# Patient Record
Sex: MALE | Race: WHITE | NOT HISPANIC OR LATINO | Employment: FULL TIME | URBAN - METROPOLITAN AREA
[De-identification: names, ages, dates, MRNs, and addresses within clinical notes are randomized per-mention and may not be internally consistent; named-entity substitution may affect disease eponyms.]

---

## 2022-12-09 ENCOUNTER — LAB (OUTPATIENT)
Dept: LAB | Facility: HOSPITAL | Age: 62
End: 2022-12-09

## 2022-12-09 ENCOUNTER — OFFICE VISIT (OUTPATIENT)
Dept: FAMILY MEDICINE CLINIC | Facility: CLINIC | Age: 62
End: 2022-12-09

## 2022-12-09 ENCOUNTER — PATIENT ROUNDING (BHMG ONLY) (OUTPATIENT)
Dept: FAMILY MEDICINE CLINIC | Facility: CLINIC | Age: 62
End: 2022-12-09

## 2022-12-09 VITALS
HEIGHT: 68 IN | WEIGHT: 206.6 LBS | BODY MASS INDEX: 31.31 KG/M2 | DIASTOLIC BLOOD PRESSURE: 102 MMHG | OXYGEN SATURATION: 98 % | SYSTOLIC BLOOD PRESSURE: 140 MMHG | HEART RATE: 72 BPM

## 2022-12-09 DIAGNOSIS — I10 PRIMARY HYPERTENSION: ICD-10-CM

## 2022-12-09 DIAGNOSIS — F41.9 ANXIETY: ICD-10-CM

## 2022-12-09 DIAGNOSIS — Z00.00 ENCOUNTER FOR MEDICAL EXAMINATION TO ESTABLISH CARE: ICD-10-CM

## 2022-12-09 DIAGNOSIS — Z13.1 SCREENING FOR DIABETES MELLITUS: ICD-10-CM

## 2022-12-09 DIAGNOSIS — I10 PRIMARY HYPERTENSION: Primary | ICD-10-CM

## 2022-12-09 DIAGNOSIS — E78.2 MIXED HYPERLIPIDEMIA: ICD-10-CM

## 2022-12-09 DIAGNOSIS — N40.0 BENIGN PROSTATIC HYPERPLASIA WITHOUT LOWER URINARY TRACT SYMPTOMS: ICD-10-CM

## 2022-12-09 DIAGNOSIS — Z51.81 MEDICATION MONITORING ENCOUNTER: ICD-10-CM

## 2022-12-09 DIAGNOSIS — Z23 IMMUNIZATION DUE: ICD-10-CM

## 2022-12-09 DIAGNOSIS — F90.9 ATTENTION DEFICIT HYPERACTIVITY DISORDER (ADHD), UNSPECIFIED ADHD TYPE: ICD-10-CM

## 2022-12-09 LAB
ALBUMIN SERPL-MCNC: 4.4 G/DL (ref 3.5–5.2)
ALBUMIN/GLOB SERPL: 1.7 G/DL
ALP SERPL-CCNC: 117 U/L (ref 39–117)
ALT SERPL W P-5'-P-CCNC: 16 U/L (ref 1–41)
ANION GAP SERPL CALCULATED.3IONS-SCNC: 9.2 MMOL/L (ref 5–15)
AST SERPL-CCNC: 12 U/L (ref 1–40)
BILIRUB SERPL-MCNC: 0.3 MG/DL (ref 0–1.2)
BUN SERPL-MCNC: 19 MG/DL (ref 8–23)
BUN/CREAT SERPL: 16.1 (ref 7–25)
CALCIUM SPEC-SCNC: 9.1 MG/DL (ref 8.6–10.5)
CHLORIDE SERPL-SCNC: 105 MMOL/L (ref 98–107)
CHOLEST SERPL-MCNC: 242 MG/DL (ref 0–200)
CO2 SERPL-SCNC: 24.8 MMOL/L (ref 22–29)
CREAT SERPL-MCNC: 1.18 MG/DL (ref 0.76–1.27)
DEPRECATED RDW RBC AUTO: 44.2 FL (ref 37–54)
EGFRCR SERPLBLD CKD-EPI 2021: 69.3 ML/MIN/1.73
ERYTHROCYTE [DISTWIDTH] IN BLOOD BY AUTOMATED COUNT: 13.1 % (ref 12.3–15.4)
GLOBULIN UR ELPH-MCNC: 2.6 GM/DL
GLUCOSE SERPL-MCNC: 102 MG/DL (ref 65–99)
HBA1C MFR BLD: 6.3 % (ref 4.8–5.6)
HCT VFR BLD AUTO: 47.1 % (ref 37.5–51)
HDLC SERPL-MCNC: 54 MG/DL (ref 40–60)
HGB BLD-MCNC: 15.3 G/DL (ref 13–17.7)
LDLC SERPL CALC-MCNC: 172 MG/DL (ref 0–100)
LDLC/HDLC SERPL: 3.14 {RATIO}
MCH RBC QN AUTO: 29.6 PG (ref 26.6–33)
MCHC RBC AUTO-ENTMCNC: 32.5 G/DL (ref 31.5–35.7)
MCV RBC AUTO: 91.1 FL (ref 79–97)
PLATELET # BLD AUTO: 391 10*3/MM3 (ref 140–450)
PMV BLD AUTO: 9.4 FL (ref 6–12)
POTASSIUM SERPL-SCNC: 4.9 MMOL/L (ref 3.5–5.2)
PROT SERPL-MCNC: 7 G/DL (ref 6–8.5)
RBC # BLD AUTO: 5.17 10*6/MM3 (ref 4.14–5.8)
SODIUM SERPL-SCNC: 139 MMOL/L (ref 136–145)
TRIGL SERPL-MCNC: 93 MG/DL (ref 0–150)
TSH SERPL DL<=0.05 MIU/L-ACNC: 1.32 UIU/ML (ref 0.27–4.2)
VLDLC SERPL-MCNC: 16 MG/DL (ref 5–40)
WBC NRBC COR # BLD: 6.82 10*3/MM3 (ref 3.4–10.8)

## 2022-12-09 PROCEDURE — 99204 OFFICE O/P NEW MOD 45 MIN: CPT | Performed by: STUDENT IN AN ORGANIZED HEALTH CARE EDUCATION/TRAINING PROGRAM

## 2022-12-09 PROCEDURE — 90471 IMMUNIZATION ADMIN: CPT | Performed by: STUDENT IN AN ORGANIZED HEALTH CARE EDUCATION/TRAINING PROGRAM

## 2022-12-09 PROCEDURE — 80053 COMPREHEN METABOLIC PANEL: CPT

## 2022-12-09 PROCEDURE — 90686 IIV4 VACC NO PRSV 0.5 ML IM: CPT | Performed by: STUDENT IN AN ORGANIZED HEALTH CARE EDUCATION/TRAINING PROGRAM

## 2022-12-09 PROCEDURE — 84443 ASSAY THYROID STIM HORMONE: CPT

## 2022-12-09 PROCEDURE — 80061 LIPID PANEL: CPT

## 2022-12-09 PROCEDURE — 83036 HEMOGLOBIN GLYCOSYLATED A1C: CPT

## 2022-12-09 PROCEDURE — 93000 ELECTROCARDIOGRAM COMPLETE: CPT | Performed by: STUDENT IN AN ORGANIZED HEALTH CARE EDUCATION/TRAINING PROGRAM

## 2022-12-09 PROCEDURE — 85027 COMPLETE CBC AUTOMATED: CPT

## 2022-12-09 RX ORDER — LISINOPRIL 40 MG/1
40 TABLET ORAL DAILY
Qty: 90 TABLET | Refills: 3 | Status: SHIPPED | OUTPATIENT
Start: 2022-12-09

## 2022-12-09 RX ORDER — ATORVASTATIN CALCIUM 20 MG/1
20 TABLET, FILM COATED ORAL DAILY
COMMUNITY
End: 2022-12-09

## 2022-12-09 RX ORDER — GUANFACINE 2 MG/1
TABLET, EXTENDED RELEASE ORAL
COMMUNITY

## 2022-12-09 RX ORDER — ATORVASTATIN CALCIUM 20 MG/1
20 TABLET, FILM COATED ORAL DAILY
Qty: 90 TABLET | Status: CANCELLED | OUTPATIENT
Start: 2022-12-09

## 2022-12-09 RX ORDER — ROSUVASTATIN CALCIUM 20 MG/1
20 TABLET, COATED ORAL DAILY
COMMUNITY
End: 2022-12-09 | Stop reason: SDUPTHER

## 2022-12-09 RX ORDER — LISINOPRIL 40 MG/1
40 TABLET ORAL DAILY
COMMUNITY
End: 2022-12-09 | Stop reason: SDUPTHER

## 2022-12-09 RX ORDER — TAMSULOSIN HYDROCHLORIDE 0.4 MG/1
1 CAPSULE ORAL DAILY
COMMUNITY
End: 2023-02-27 | Stop reason: SDUPTHER

## 2022-12-09 RX ORDER — ROSUVASTATIN CALCIUM 20 MG/1
20 TABLET, COATED ORAL DAILY
Qty: 90 TABLET | Refills: 3 | Status: SHIPPED | OUTPATIENT
Start: 2022-12-09 | End: 2022-12-16 | Stop reason: SDUPTHER

## 2022-12-09 NOTE — PROGRESS NOTES
New Patient Office Visit      Patient Name: Fuentes Vanessa  : 1960   MRN: [id number]   Care Team: Patient Care Team:  Clarisse Francois DO as PCP - General (Internal Medicine)  Provider, No Known as PCP - Family Medicine    Chief Complaint:    Chief Complaint   Patient presents with   • new patient preventative medicine service       History of Present Illness: Fuentes Vanessa is a 62 y.o. male with HTN, HLD, ADHD, BPH who is here today to establish care.  Previously followed with Dr. Hoyt in [place] but he retired. He is a .  with children.     HTN - has been out of Lisinopril for a few days. He typically takes lisinopril 10mg daily and reports home readings are typically normal.    HLD - taking rosuvastatin 20mg daily. Due for lipid panel    He is overall feeling well today. No acute complaints. Wants flu shot.     BPH is well controlled - denies LUTS since starting flomax    Following with behavioral health for ADHD and anxiety - taking vyvanse and recently started guanfacine. Reports anxiety has improved with this. His behavioral health provide has requested an EKG and we will plan for this today.      Subjective      Review of Systems:   Review of Systems - See HPI    Past Medical History: History reviewed. No pertinent past medical history.    Past Surgical History: History reviewed. No pertinent surgical history.    Family History: History reviewed. No pertinent family history.    Social History:   Social History     Socioeconomic History   • Marital status:    Tobacco Use   • Smoking status: Never   • Smokeless tobacco: Never   Vaping Use   • Vaping Use: Never used   Substance and Sexual Activity   • Alcohol use: Yes   • Drug use: Never   • Sexual activity: Defer       Tobacco History:   Social History     Tobacco Use   Smoking Status Never   Smokeless Tobacco Never       Medications:     Current Outpatient Medications:   •  guanFACINE HCl ER 2 MG tablet  "sustained-release 24 hour, Take  by mouth., Disp: , Rfl:   •  lisdexamfetamine (VYVANSE) 20 MG capsule, Take 20 mg by mouth Daily, Disp: , Rfl:   •  lisinopril (PRINIVIL,ZESTRIL) 40 MG tablet, Take 1 tablet by mouth Daily., Disp: 90 tablet, Rfl: 3  •  rosuvastatin (CRESTOR) 20 MG tablet, Take 1 tablet by mouth Daily., Disp: 90 tablet, Rfl: 3  •  tamsulosin (FLOMAX) 0.4 MG capsule 24 hr capsule, Take 1 capsule by mouth Daily., Disp: , Rfl:     Allergies:   Allergies   Allergen Reactions   • Penicillin G Unknown - High Severity       Objective     Physical Exam:  Vital Signs:   Vitals:    12/09/22 1016   BP: (!) 140/102   Pulse: 72   SpO2: 98%   Weight: 93.7 kg (206 lb 9.6 oz)   Height: 172.7 cm (68\")     Body mass index is 31.41 kg/m².     Physical Exam  Vitals reviewed.   Constitutional:       Appearance: Normal appearance.   Cardiovascular:      Rate and Rhythm: Normal rate and regular rhythm.      Heart sounds: Normal heart sounds.   Pulmonary:      Effort: Pulmonary effort is normal. No respiratory distress.      Breath sounds: Normal breath sounds. No wheezing.   Skin:     General: Skin is warm and dry.   Neurological:      Mental Status: He is alert.   Psychiatric:         Mood and Affect: Mood normal.         Behavior: Behavior normal.         Judgment: Judgment normal.          ECG 12 Lead    Date/Time: 12/9/2022 10:54 AM  Performed by: Clarisse Francois DO  Authorized by: Clarisse Francois DO   Comparison: compared with previous ECG from 6/22/2015  Comparison to previous ECG: Prior bradycardia no longer present. Otherwise similar  Rhythm: sinus rhythm  Rate: normal  QRS axis: normal    Clinical impression: normal ECG              Assessment / Plan      Assessment/Plan:   Problems Addressed This Visit  Diagnoses and all orders for this visit:    1. Primary hypertension (Primary)  -     lisinopril (PRINIVIL,ZESTRIL) 40 MG tablet; Take 1 tablet by mouth Daily.  Dispense: 90 tablet; Refill: 3  -     CBC (No " Diff); Future  -     Comprehensive Metabolic Panel; Future  -     TSH; Future  Uncontrolled today likely due to being out of lisinopril. Refilled today. Discussed goal of <130/80 and he will monitor at home. Recommended weight loss and routine exercise.    2. Mixed hyperlipidemia  -     rosuvastatin (CRESTOR) 20 MG tablet; Take 1 tablet by mouth Daily.  Dispense: 90 tablet; Refill: 3  -     Lipid Panel; Future  Lipid panel today - continue rosuvastatin 20mg, refilled today    3. Screening for diabetes mellitus  -     Hemoglobin A1c; Future    4. Encounter for medical examination to establish care  -     CBC (No Diff); Future  -     Lipid Panel; Future  -     Hemoglobin A1c; Future  -     Comprehensive Metabolic Panel; Future  -     TSH; Future    5. Immunization due  -     FluLaval/Fluzone >6 mos ([id number])    6. Medication monitoring encounter  -     ECG 12 Lead    7. Attention deficit hyperactivity disorder (ADHD), unspecified ADHD type  8. Anxiety  -     ECG 12 Lead  Following with [facility]   EKG performed as requested by behavioral health provider - NSR    9. Benign prostatic hyperplasia without lower urinary tract symptoms  Well controlled with tamsulosin 0.4mg, continue without changes        Plan of care reviewed with patient at the conclusion of today's visit. Education was provided regarding diagnosis and management.  Patient verbalizes understanding of and agreement with management plan.      Follow Up:   Return in about 6 months (around 6/9/2023) for Annual.          DO RICHARD Mack RD  [facility]  [address]  [facility] [address]  Fax [phone]  Phone [phone]

## 2022-12-16 DIAGNOSIS — E78.2 MIXED HYPERLIPIDEMIA: ICD-10-CM

## 2022-12-16 RX ORDER — ROSUVASTATIN CALCIUM 40 MG/1
40 TABLET, COATED ORAL DAILY
Qty: 30 TABLET | Refills: 5 | Status: SHIPPED | OUTPATIENT
Start: 2022-12-16 | End: 2023-01-31 | Stop reason: SDUPTHER

## 2023-01-07 ENCOUNTER — PATIENT MESSAGE (OUTPATIENT)
Dept: FAMILY MEDICINE CLINIC | Facility: CLINIC | Age: 63
End: 2023-01-07
Payer: COMMERCIAL

## 2023-01-07 DIAGNOSIS — R10.13 DYSPEPSIA: Primary | ICD-10-CM

## 2023-01-10 RX ORDER — ESOMEPRAZOLE MAGNESIUM 40 MG/1
40 CAPSULE, DELAYED RELEASE ORAL
Qty: 30 CAPSULE | Refills: 5 | Status: SHIPPED | OUTPATIENT
Start: 2023-01-10

## 2023-01-31 DIAGNOSIS — E78.2 MIXED HYPERLIPIDEMIA: ICD-10-CM

## 2023-02-01 RX ORDER — ROSUVASTATIN CALCIUM 40 MG/1
40 TABLET, COATED ORAL DAILY
Qty: 30 TABLET | Refills: 5 | Status: SHIPPED | OUTPATIENT
Start: 2023-02-01

## 2023-02-01 NOTE — TELEPHONE ENCOUNTER
Rx Refill Note  Requested Prescriptions     Pending Prescriptions Disp Refills   • rosuvastatin (CRESTOR) 40 MG tablet 30 tablet 5     Sig: Take 1 tablet by mouth Daily.      Last office visit with prescribing clinician: 12/9/2022   Last telemedicine visit with prescribing clinician: 6/9/2023   Next office visit with prescribing clinician: 6/9/2023                         Would you like a call back once the refill request has been completed: [] Yes [] No    If the office needs to give you a call back, can they leave a voicemail: [] Yes [] No    Claribel Tucker MA  02/01/23, 07:41 EST

## 2023-02-28 RX ORDER — TAMSULOSIN HYDROCHLORIDE 0.4 MG/1
1 CAPSULE ORAL DAILY
Qty: 30 CAPSULE | Refills: 5 | Status: SHIPPED | OUTPATIENT
Start: 2023-02-28

## 2023-02-28 NOTE — TELEPHONE ENCOUNTER
Rx Refill Note  Requested Prescriptions     Pending Prescriptions Disp Refills   • tamsulosin (FLOMAX) 0.4 MG capsule 24 hr capsule 30 capsule      Sig: Take 1 capsule by mouth Daily.      Last office visit with prescribing clinician: 12/9/2022   Last telemedicine visit with prescribing clinician: 6/9/2023   Next office visit with prescribing clinician: 6/9/2023                         Would you like a call back once the refill request has been completed: [] Yes [] No    If the office needs to give you a call back, can they leave a voicemail: [] Yes [] No    Purvi Hallman MA  02/28/23, 08:16 EST

## 2023-03-16 DIAGNOSIS — R10.13 DYSPEPSIA: ICD-10-CM

## 2023-03-17 RX ORDER — ESOMEPRAZOLE MAGNESIUM 40 MG/1
40 CAPSULE, DELAYED RELEASE ORAL
Qty: 30 CAPSULE | Refills: 5 | OUTPATIENT
Start: 2023-03-17

## 2023-06-09 ENCOUNTER — LAB (OUTPATIENT)
Dept: LAB | Facility: HOSPITAL | Age: 63
End: 2023-06-09
Payer: COMMERCIAL

## 2023-06-09 ENCOUNTER — OFFICE VISIT (OUTPATIENT)
Dept: FAMILY MEDICINE CLINIC | Facility: CLINIC | Age: 63
End: 2023-06-09
Payer: COMMERCIAL

## 2023-06-09 VITALS
BODY MASS INDEX: 29.7 KG/M2 | DIASTOLIC BLOOD PRESSURE: 80 MMHG | SYSTOLIC BLOOD PRESSURE: 124 MMHG | WEIGHT: 196 LBS | HEART RATE: 78 BPM | OXYGEN SATURATION: 98 % | HEIGHT: 68 IN

## 2023-06-09 DIAGNOSIS — G47.9 SLEEP DIFFICULTIES: ICD-10-CM

## 2023-06-09 DIAGNOSIS — R73.03 PREDIABETES: ICD-10-CM

## 2023-06-09 DIAGNOSIS — Z12.11 SCREENING FOR COLON CANCER: ICD-10-CM

## 2023-06-09 DIAGNOSIS — Z00.00 ANNUAL PHYSICAL EXAM: ICD-10-CM

## 2023-06-09 DIAGNOSIS — Z12.5 SCREENING PSA (PROSTATE SPECIFIC ANTIGEN): ICD-10-CM

## 2023-06-09 DIAGNOSIS — Z00.00 ANNUAL PHYSICAL EXAM: Primary | ICD-10-CM

## 2023-06-09 DIAGNOSIS — E78.2 MIXED HYPERLIPIDEMIA: ICD-10-CM

## 2023-06-09 DIAGNOSIS — B35.1 ONYCHOMYCOSIS: ICD-10-CM

## 2023-06-09 DIAGNOSIS — Z11.59 ENCOUNTER FOR HEPATITIS C SCREENING TEST FOR LOW RISK PATIENT: ICD-10-CM

## 2023-06-09 DIAGNOSIS — I10 PRIMARY HYPERTENSION: ICD-10-CM

## 2023-06-09 PROCEDURE — 99396 PREV VISIT EST AGE 40-64: CPT | Performed by: STUDENT IN AN ORGANIZED HEALTH CARE EDUCATION/TRAINING PROGRAM

## 2023-06-09 RX ORDER — TRAZODONE HYDROCHLORIDE 50 MG/1
50 TABLET ORAL NIGHTLY
Qty: 30 TABLET | Refills: 5 | Status: SHIPPED | OUTPATIENT
Start: 2023-06-09

## 2023-06-09 RX ORDER — TERBINAFINE HYDROCHLORIDE 250 MG/1
250 TABLET ORAL DAILY
Qty: 84 TABLET | Refills: 0 | Status: SHIPPED | OUTPATIENT
Start: 2023-06-09 | End: 2023-09-01

## 2023-06-09 NOTE — PROGRESS NOTES
Physical Exam     Patient Name: Fuentes Vanessa  : 1960   MRN: [id number]   Care Team: Patient Care Team:  Clarisse Francois DO as PCP - General (Internal Medicine)  Provider, No Known as PCP - Family Medicine    Chief Complaint:    Chief Complaint   Patient presents with    Annual Exam       History of Present Illness: Fuentes Vanessa is a 63 y.o. male  with HTN, HLD, ADHD, BPH  who is presents today for annual healthcare maintenance. He is a .  with children.     HTN - well controlled with lisinopril    HLD - taking rosuvastatin 40mg daily, increased from 20mg last visit     Reports having some difficulty sleeping. Has been following with behavioral health and taking trazodone which has been helpful.     Reports concerns for toe fungus on bilateral feet R>L. Has tried OTC therapies and symptoms persist     PHQ-2 Depression Screening  Little interest or pleasure in doing things? 0-->not at all   Feeling down, depressed, or hopeless? 0-->not at all   PHQ-2 Total Score 0       Health Maintenance   Topic Date Due    COLORECTAL CANCER SCREENING  Never done    ZOSTER VACCINE (1 of 2) Never done    HEPATITIS C SCREENING  Never done    COVID-19 Vaccine (3 - Moderna series) 2023 (Originally 2021)    INFLUENZA VACCINE  2023    LIPID PANEL  2023    TDAP/TD VACCINES (2 - Td or Tdap) 2024    ANNUAL PHYSICAL  2024    Pneumococcal Vaccine 0-64  Aged Out       Subjective      Review of Systems:   Review of Systems - See HPI    Past Medical History: History reviewed. No pertinent past medical history.    Past Surgical History: History reviewed. No pertinent surgical history.    Family History: History reviewed. No pertinent family history.    Social History:   Social History     Socioeconomic History    Marital status:    Tobacco Use    Smoking status: Never    Smokeless tobacco: Never   Vaping Use    Vaping Use: Never used   Substance and Sexual  "Activity    Alcohol use: Yes    Drug use: Never    Sexual activity: Defer       Tobacco History:   Social History     Tobacco Use   Smoking Status Never   Smokeless Tobacco Never       Medications:     Current Outpatient Medications:     esomeprazole (nexIUM) 40 MG capsule, Take 1 capsule by mouth Every Morning Before Breakfast., Disp: 30 capsule, Rfl: 5    guanFACINE HCl ER 2 MG tablet sustained-release 24 hour, Take  by mouth., Disp: , Rfl:     lisdexamfetamine (VYVANSE) 20 MG capsule, Take 1 capsule by mouth Daily, Disp: , Rfl:     lisinopril (PRINIVIL,ZESTRIL) 40 MG tablet, Take 1 tablet by mouth Daily., Disp: 90 tablet, Rfl: 3    rosuvastatin (CRESTOR) 40 MG tablet, Take 1 tablet by mouth Daily., Disp: 30 tablet, Rfl: 5    tamsulosin (FLOMAX) 0.4 MG capsule 24 hr capsule, Take 1 capsule by mouth Daily., Disp: 30 capsule, Rfl: 5    terbinafine (lamiSIL) 250 MG tablet, Take 1 tablet by mouth Daily for 84 days., Disp: 84 tablet, Rfl: 0    traZODone (DESYREL) 50 MG tablet, Take 1 tablet by mouth Every Night., Disp: 30 tablet, Rfl: 5    Allergies:   Allergies   Allergen Reactions    Penicillin G Unknown - High Severity       Past Medical History, Social History, Family History and Care Team were all reviewed with patient and updated as appropriate.       Objective     Physical Exam:  Vital Signs:   Vitals:    06/09/23 0903   BP: 124/80   Pulse: 78   SpO2: 98%   Weight: 88.9 kg (196 lb)   Height: 172.7 cm (68\")     Body mass index is 29.8 kg/m².     Physical Exam  Vitals reviewed.   Constitutional:       Appearance: Normal appearance. He is not ill-appearing.   Cardiovascular:      Rate and Rhythm: Normal rate and regular rhythm.      Pulses: Normal pulses.      Heart sounds: Normal heart sounds.   Pulmonary:      Effort: Pulmonary effort is normal. No respiratory distress.      Breath sounds: Normal breath sounds. No wheezing.   Skin:     General: Skin is warm and dry.      Comments: Thickening and yellow " discoloration of all toes bilaterally, R>L   Neurological:      Mental Status: He is alert.   Psychiatric:         Mood and Affect: Mood normal.         Behavior: Behavior normal.         Judgment: Judgment normal.       Assessment / Plan      Assessment/Plan:   Problems Addressed This Visit  Diagnoses and all orders for this visit:    1. Annual physical exam (Primary)  -     Lipid Panel w/ Chol/HDL Ratio; Future  -     Hemoglobin A1c; Future  -     Comprehensive metabolic panel; Future  -     PSA SCREENING; Future  -     Ambulatory Referral For Screening Colonoscopy    Healthcare Maintenance   Vaccines:  -shingrix and covid booster due  Encouraged shingrix at pharmacy, he politely declined covid booster    Cancer Screening  -Colonoscopy - due, ordered today  -Lung cancer screening not indicated, nonsmoker   -PSA due today    Other  -PHQ score 0  -Counseled on importance of maintaining healthy diet and routine exercise. Encouraged 150 min exercise per week.  -Tobacco cessation: not indicated, nonsmoker  -Blood pressure is at goal <130/80  -Metabolic screening today    Encouraged routine eye and dental exams.     2. Mixed hyperlipidemia  -     Lipid Panel w/ Chol/HDL Ratio; Future    Continue rosuvastatin 40mg daily, lipid panel today    3. Primary hypertension  -     Comprehensive metabolic panel; Future  well controlled with lisinopril, continue without changes    4. Prediabetes  -     Hemoglobin A1c; Future  A1c today. Encouraged dietary modification, exercise.     5. Encounter for hepatitis C screening test for low risk patient  -     Hepatitis C antibody; Future    6. Screening PSA (prostate specific antigen)  -     PSA SCREENING; Future    7. Sleep difficulties  -     traZODone (DESYREL) 50 MG tablet; Take 1 tablet by mouth Every Night.  Dispense: 30 tablet; Refill: 5  Improved with trazodone 0.5-1 tablet qhs prn, continue without changes    8. Onychomycosis  -     terbinafine (lamiSIL) 250 MG tablet; Take 1  tablet by mouth Daily for 84 days.  Dispense: 84 tablet; Refill: 0  Liver enzymes are normal and will repeat today to confirm still normal. He has failed topical therapies. Will treat with oral Terbinafine x 12 weeks.     9. Screening for colon cancer  -     Ambulatory Referral For Screening Colonoscopy    Following with behavioral health for ADHD and anxiety     Overweight - congratulated on 10lb weight loss over the past few months    Plan of care reviewed with patient at the conclusion of today's visit. Education was provided regarding diagnosis and management.  Patient verbalizes understanding of and agreement with management plan.    Follow Up:   Return in about 1 year (around 6/9/2024) for Annual.        DO RICHARD Mack RD  [facility]  [address]  [facility] [address]  Fax [phone]  Phone [phone]

## 2023-06-10 LAB
ALBUMIN SERPL-MCNC: 4.4 G/DL (ref 3.5–5.2)
ALBUMIN/GLOB SERPL: 1.8 G/DL
ALP SERPL-CCNC: 115 U/L (ref 39–117)
ALT SERPL-CCNC: 25 U/L (ref 1–41)
AST SERPL-CCNC: 19 U/L (ref 1–40)
BILIRUB SERPL-MCNC: 0.4 MG/DL (ref 0–1.2)
BUN SERPL-MCNC: 14 MG/DL (ref 8–23)
BUN/CREAT SERPL: 15.4 (ref 7–25)
CALCIUM SERPL-MCNC: 9.6 MG/DL (ref 8.6–10.5)
CHLORIDE SERPL-SCNC: 103 MMOL/L (ref 98–107)
CHOLEST SERPL-MCNC: 174 MG/DL (ref 0–200)
CHOLEST/HDLC SERPL: 2.76 {RATIO}
CO2 SERPL-SCNC: 27.2 MMOL/L (ref 22–29)
CREAT SERPL-MCNC: 0.91 MG/DL (ref 0.76–1.27)
EGFRCR SERPLBLD CKD-EPI 2021: 94.1 ML/MIN/1.73
GLOBULIN SER CALC-MCNC: 2.4 GM/DL
GLUCOSE SERPL-MCNC: 112 MG/DL (ref 65–99)
HBA1C MFR BLD: 6.1 % (ref 4.8–5.6)
HCV IGG SERPL QL IA: NON REACTIVE
HDLC SERPL-MCNC: 63 MG/DL (ref 40–60)
LDLC SERPL CALC-MCNC: 88 MG/DL (ref 0–100)
POTASSIUM SERPL-SCNC: 4.4 MMOL/L (ref 3.5–5.2)
PROT SERPL-MCNC: 6.8 G/DL (ref 6–8.5)
PSA SERPL-MCNC: 1.58 NG/ML (ref 0–4)
SODIUM SERPL-SCNC: 139 MMOL/L (ref 136–145)
TRIGL SERPL-MCNC: 135 MG/DL (ref 0–150)
VLDLC SERPL CALC-MCNC: 23 MG/DL (ref 5–40)

## 2023-06-11 DIAGNOSIS — I10 PRIMARY HYPERTENSION: ICD-10-CM

## 2023-06-12 RX ORDER — LISINOPRIL 40 MG/1
40 TABLET ORAL DAILY
Qty: 90 TABLET | Refills: 3 | Status: SHIPPED | OUTPATIENT
Start: 2023-06-12

## 2023-06-12 NOTE — TELEPHONE ENCOUNTER
Rx Refill Note  Requested Prescriptions     Pending Prescriptions Disp Refills    lisinopril (PRINIVIL,ZESTRIL) 40 MG tablet 90 tablet 3     Sig: Take 1 tablet by mouth Daily.      Last office visit with prescribing clinician: 6/9/2023   Last telemedicine visit with prescribing clinician: Visit date not found   Next office visit with prescribing clinician: Visit date not found                         Would you like a call back once the refill request has been completed: [] Yes [] No    If the office needs to give you a call back, can they leave a voicemail: [] Yes [] No    Claribel Tucker MA  06/12/23, 07:46 EDT

## 2023-07-31 ENCOUNTER — PATIENT MESSAGE (OUTPATIENT)
Dept: FAMILY MEDICINE CLINIC | Facility: CLINIC | Age: 63
End: 2023-07-31
Payer: COMMERCIAL

## 2023-07-31 DIAGNOSIS — M25.512 CHRONIC LEFT SHOULDER PAIN: Primary | ICD-10-CM

## 2023-07-31 DIAGNOSIS — M25.50 POLYARTHRALGIA: ICD-10-CM

## 2023-07-31 DIAGNOSIS — G89.29 CHRONIC LEFT SHOULDER PAIN: Primary | ICD-10-CM

## 2023-08-04 RX ORDER — NAPROXEN 500 MG/1
500 TABLET ORAL 2 TIMES DAILY WITH MEALS
Qty: 30 TABLET | Refills: 0 | Status: SHIPPED | OUTPATIENT
Start: 2023-08-04

## 2023-08-08 ENCOUNTER — TREATMENT (OUTPATIENT)
Dept: PHYSICAL THERAPY | Facility: CLINIC | Age: 63
End: 2023-08-08
Payer: COMMERCIAL

## 2023-08-08 DIAGNOSIS — M25.512 ACUTE PAIN OF LEFT SHOULDER: Primary | ICD-10-CM

## 2023-08-08 PROCEDURE — 97110 THERAPEUTIC EXERCISES: CPT | Performed by: PHYSICAL THERAPIST

## 2023-08-08 PROCEDURE — 97162 PT EVAL MOD COMPLEX 30 MIN: CPT | Performed by: PHYSICAL THERAPIST

## 2023-08-08 PROCEDURE — 97140 MANUAL THERAPY 1/> REGIONS: CPT | Performed by: PHYSICAL THERAPIST

## 2023-08-08 NOTE — PROGRESS NOTES
Physical Therapy Initial Evaluation and Plan of Care  [address]  [place] [address]    Patient: Fuentes Vanessa   : 1960  Diagnosis/ICD-10 Code:  No primary diagnosis found.  Referring practitioner: Clarisse Francois DO  Date of Initial Visit: 2023  Today's Date: 2023  Patient seen for Visit count could not be calculated. Make sure you are using a visit which is associated with an episode. session         Visit Diagnoses:  No diagnosis found.      Subjective Questionnaire: QuickDASH: 47.43      Subjective Evaluation    History of Present Illness  Mechanism of injury: Pt is a 63 year old male presenting to the clinic with left shoulder pain. This began around a month ago, insidious onset. He started waking up at night due to pain. He has some clicking and popping. Reaching behind his back, reaching overhead, and lifting are all bothersome activities. He is not having any numbness or tingling. He gets pain referring down his bicep at times.       Patient Occupation: Teacher Quality of life: excellent    Pain  Current pain ratin  At worst pain ratin  Quality: dull ache  Relieving factors: medications (naproxen)  Aggravating factors: repetitive movement, sleeping, lifting, outstretched reach and movement  Progression: worsening    Hand dominance: right    Diagnostic Tests  X-ray: abnormal (Moderate arthritis)    Patient Goals  Patient goals for therapy: decreased pain, increased motion, increased strength, independence with ADLs/IADLs and return to sport/leisure activities           Objective          Active Range of Motion   Left Shoulder   Flexion: 165 degrees   Abduction: 158 degrees with pain    Right Shoulder   Flexion: 170 degrees   Abduction: 160 degrees     Additional Active Range of Motion Details  Apleys IR R: T8  Apleys IR L: T8  Apleys ER R: T3  Apleys ER L: T3    Strength/Myotome Testing     Left Shoulder     Planes of Motion   Flexion: 4-   Adduction:  4-   External rotation at 90ø: 4   Internal rotation at 0ø: 5     Right Shoulder     Planes of Motion   Flexion: 4+   Adduction: 4+   External rotation at 90ø: 5   Internal rotation at 0ø: 5     Tests     Left Shoulder   Positive empty can, Hawkin's and lift-off.         Assessment & Plan     Assessment  Impairments: abnormal muscle tone, abnormal or restricted ROM, activity intolerance, impaired physical strength, lacks appropriate home exercise program and pain with function  Functional Limitations: lifting, sleeping, pushing, uncomfortable because of pain, reaching behind back, reaching overhead and unable to perform repetitive tasks  Assessment details: Pt is a 63 year old male presenting to the clinic with left shoulder pain, signs and symptoms consistent with rotator cuff tendinopathy. He demonstrates decreased AROM, decreased strength, and positive Glasgow, lift off, and empty can. His impairments are limiting his ability to sleep comfortably. Pt would benefit from skilled PT to address his impairments so he can reach his long term goals.   Prognosis: good    Goals  Plan Goals: SHORT TERM GOALS:     2 weeks  1. Pt I w/ HEP  2. Pt to demonstrate PROM of the left shoulder to WFL to improve ability to perform ADL's  3. Pt to demonstrate ability to perform 30 minutes continuous activity in the clinic without increase in pain     LONG TERM GOALS:   6 weeks  1. Pt to demonstrate AROM of the left shoulder to WNL to allow ability to perform all necessary functional activities  2. Pt to demonstrate ability to lift 5# OH with the left arm without increase in pain  3. Pt to report being able to work full duty without increase in pain in the shoulder  4. Pt to tolerate 60 minutes continuous activity in the clinic without increase in pain      Plan  Therapy options: will be seen for skilled therapy services  Planned modality interventions: cryotherapy, dry needling, electrical stimulation/Russian stimulation, high voltage  pulsed current (pain management), TENS, thermotherapy (hydrocollator packs) and ultrasound  Planned therapy interventions: manual therapy, neuromuscular re-education, postural training, soft tissue mobilization, spinal/joint mobilization, strengthening, stretching, therapeutic activities, home exercise program, functional ROM exercises and body mechanics training  Duration in visits: 1  Duration in weeks: 12  Treatment plan discussed with: patient      History # of Personal Factors and/or Comorbidities: MODERATE (1-2)  Examination of Body System(s): # of elements: MODERATE (3)  Clinical Presentation: STABLE   Clinical Decision Making: MODERATE      Timed:         Manual Therapy:    8     mins  00864;     Therapeutic Exercise:    10     mins  33419;     Neuromuscular Chuck:        mins  36763;    Therapeutic Activity:          mins  24364;     Gait Training:           mins  89680;     Ultrasound:          mins  41694;    Ionto                                   mins   29370  Self Care                            mins   56974  Canalith Repos         mins 94418      Un-Timed:  Electrical Stimulation:         mins  41063 ( );  Dry Needling          mins self-pay  Traction          mins 89704  Low Eval          Mins  01800  Mod Eval     28     Mins  81491  High Eval                            Mins  35177        Timed Treatment:   46   mins   Total Treatment:     46   mins          PT: Martha Galvin, [place]   License Number: [id number]  Electronically signed by Martha Galvin, PT, 08/08/23, 4:09 PM EDT    Certification Period: 8/8/2023 thru 11/5/2023  I certify that the therapy services are furnished while this patient is under my care.  The services outlined above are required by this patient, and will be reviewed every 90 days.         Physician Signature:__________________________________________________    PHYSICIAN: Clarisse Francois DO  NPI: [id number]                                       DATE:      Please sign and return via fax to [email or web address] . Thank you, [facility].

## 2023-08-15 ENCOUNTER — TREATMENT (OUTPATIENT)
Dept: PHYSICAL THERAPY | Facility: CLINIC | Age: 63
End: 2023-08-15
Payer: COMMERCIAL

## 2023-08-15 NOTE — PROGRESS NOTES
Physical Therapy Daily Progress Note    Subjective   Fuentes Vanessa reports: he is doing okay. He has not done his exercises like he should.      Objective   See Exercise, Manual, and Modality Logs for complete treatment.       Assessment/Plan  Pt tolerated treatment well. He had min pain with wall washes and scaption. He was educated on an updated HEP. Pt would benefit from continued skilled PT.     Progress per Plan of Care           Manual Therapy:    12     mins  55825;  Therapeutic Exercise:    18     mins  22479;     Neuromuscular Chuck:    10    mins  21659;    Therapeutic Activity:          mins  64660;     Gait Training:           mins  75303;     Ultrasound:          mins  08511;    Electrical Stimulation:         mins  56137 ( );  E-Stim Attended:         mins  86226  Iontophoresis          mins 95002   Traction          mins  35252  Fluidotherapy          mins  59903  Dry Needling          mins self-pay - No Charge  Paraffin          mins  69103    Timed Treatment:   40   mins   Total Treatment:     40   mins    Martha Galvin, PT, DPT  Physical Therapist

## 2023-08-24 ENCOUNTER — TREATMENT (OUTPATIENT)
Dept: PHYSICAL THERAPY | Facility: CLINIC | Age: 63
End: 2023-08-24
Payer: COMMERCIAL

## 2023-08-24 DIAGNOSIS — M25.512 ACUTE PAIN OF LEFT SHOULDER: Primary | ICD-10-CM

## 2023-08-28 RX ORDER — TAMSULOSIN HYDROCHLORIDE 0.4 MG/1
CAPSULE ORAL
Qty: 90 CAPSULE | Refills: 0 | Status: SHIPPED | OUTPATIENT
Start: 2023-08-28

## 2023-08-28 NOTE — TELEPHONE ENCOUNTER
Rx Refill Note  Requested Prescriptions     Pending Prescriptions Disp Refills    tamsulosin (FLOMAX) 0.4 MG capsule 24 hr capsule [Pharmacy Med Name: TAMSULOSIN HCL 0.4 MG CAPSULE] 90 capsule      Sig: TAKE ONE CAPSULE BY MOUTH DAILY      Last office visit with prescribing clinician: 7/11/2023   Last telemedicine visit with prescribing clinician: Visit date not found   Next office visit with prescribing clinician: Visit date not found                         Would you like a call back once the refill request has been completed: [] Yes [] No    If the office needs to give you a call back, can they leave a voicemail: [] Yes [] No    Claribel Tucker MA  08/28/23, 08:11 EDT

## 2023-08-30 ENCOUNTER — TREATMENT (OUTPATIENT)
Dept: PHYSICAL THERAPY | Facility: CLINIC | Age: 63
End: 2023-08-30
Payer: COMMERCIAL

## 2023-08-30 DIAGNOSIS — M25.512 ACUTE PAIN OF LEFT SHOULDER: Primary | ICD-10-CM

## 2023-08-30 NOTE — PROGRESS NOTES
Physical Therapy Daily Progress Note    Subjective   Fuentes Vanessa reports: he is feeling better overall but his bicep gets irritates with rows.      Objective   See Exercise, Manual, and Modality Logs for complete treatment.       Assessment/Plan  Pt tolerated treatment well. He was instructed to hold off on doing regular rows and to only to low rows and reverse flys at home. Pt would benefit from continued skilled PT.     Progress per Plan of Care           Manual Therapy:    10     mins  37798;  Therapeutic Exercise:    15     mins  07886;     Neuromuscular Chuck:    12    mins  20888;    Therapeutic Activity:          mins  43792;     Gait Training:           mins  97951;     Ultrasound:          mins  00572;    Electrical Stimulation:         mins  21656 ( );  E-Stim Attended:         mins  84476  Iontophoresis          mins 58891   Traction          mins  17593  Fluidotherapy          mins  07116  Dry Needling         mins self-pay - No Charge  Paraffin          mins  21452    Timed Treatment:   37   mins   Total Treatment:     37   mins    Martha Galvin, PT, DPT  Physical Therapist

## 2023-09-13 ENCOUNTER — TREATMENT (OUTPATIENT)
Dept: PHYSICAL THERAPY | Facility: CLINIC | Age: 63
End: 2023-09-13
Payer: COMMERCIAL

## 2023-09-13 DIAGNOSIS — M25.512 ACUTE PAIN OF LEFT SHOULDER: Primary | ICD-10-CM

## 2023-09-13 NOTE — PROGRESS NOTES
Physical Therapy Daily Progress Note    Subjective   Fuentes Vanessa reports: 70% improvement since starting PT. He cancelled his MRI because he does not feel like he needs it anymore.    Today's Pain rating: 3/10    Objective          Active Range of Motion   Left Shoulder   Flexion: 170 degrees   Abduction: 175 degrees     Strength/Myotome Testing     Left Shoulder     Planes of Motion   Flexion: 4+   Abduction: 4+   External rotation at 0°: 5   Internal rotation at 0°: 5     Isolated Muscles   Biceps: 5     See Exercise, Manual, and Modality Logs for complete treatment.       Assessment/Plan  Pt tolerated treatment well. His AROM and strength have improved significantly since starting PT. He was educated on an updated HEP. Pt would benefit from continued skilled PT.     Progress per Plan of Care           Manual Therapy:    10     mins  07787;  Therapeutic Exercise:    15     mins  67289;     Neuromuscular Chuck:    10    mins  77835;    Therapeutic Activity:          mins  53209;     Gait Training:           mins  21679;     Ultrasound:          mins  43973;    Electrical Stimulation:         mins  75547 ( );  E-Stim Attended:         mins  49592  Iontophoresis          mins 31343   Traction          mins  83491  Fluidotherapy         mins  88322  Dry Needling          mins self-pay - No Charge  Paraffin          mins  41483    Timed Treatment:   35   mins   Total Treatment:     35   mins    Martha Galvin, PT, DPT  Physical Therapist

## 2023-09-15 RX ORDER — TAMSULOSIN HYDROCHLORIDE 0.4 MG/1
CAPSULE ORAL
Qty: 90 CAPSULE | Refills: 0 | OUTPATIENT
Start: 2023-09-15

## 2023-09-15 RX ORDER — ROSUVASTATIN CALCIUM 20 MG/1
TABLET, COATED ORAL
Qty: 90 TABLET | Refills: 0 | Status: SHIPPED | OUTPATIENT
Start: 2023-09-15

## 2023-09-20 ENCOUNTER — TREATMENT (OUTPATIENT)
Dept: PHYSICAL THERAPY | Facility: CLINIC | Age: 63
End: 2023-09-20
Payer: COMMERCIAL

## 2023-09-20 DIAGNOSIS — M25.512 ACUTE PAIN OF LEFT SHOULDER: Primary | ICD-10-CM

## 2023-09-20 NOTE — PROGRESS NOTES
Physical Therapy Daily Progress Note    Subjective   Fuentes Vanessa reports: he feels like he is doing much better overall.       Objective   See Exercise, Manual, and Modality Logs for complete treatment.       Assessment/Plan  Pt tolerated treatment well. Discussed continuing his HEP on his own for a little while since his insurance is no longer covered at this facility. If his symptoms worsen, he may utilize the self pay option.     Other  Pause and potentially use self pay option if necessary         Manual Therapy:    10     mins  06372;  Therapeutic Exercise:    19     mins  77301;     Neuromuscular Chuck:    11    mins  25834;    Therapeutic Activity:          mins  83755;     Gait Training:           mins  58431;     Ultrasound:          mins  61263;    Electrical Stimulation:         mins  00908 ( );  E-Stim Attended:         mins  88869  Iontophoresis          mins 90424   Traction          mins  68306  Fluidotherapy          mins  40711  Dry Needling          mins self-pay - No Charge  Paraffin          mins  35134    Timed Treatment:   40   mins   Total Treatment:     40   mins    Martha Galvin, PT, DPT  Physical Therapist

## 2023-10-04 DIAGNOSIS — R10.13 DYSPEPSIA: ICD-10-CM

## 2023-10-04 RX ORDER — ESOMEPRAZOLE MAGNESIUM 40 MG/1
CAPSULE, DELAYED RELEASE ORAL
Qty: 30 CAPSULE | Refills: 2 | Status: SHIPPED | OUTPATIENT
Start: 2023-10-04

## 2023-10-04 NOTE — TELEPHONE ENCOUNTER
Rx Refill Note  Requested Prescriptions     Pending Prescriptions Disp Refills    esomeprazole (nexIUM) 40 MG capsule [Pharmacy Med Name: ESOMEPRAZOLE MAG DR 40 MG CAP] 30 capsule 2     Sig: TAKE 1 CAPSULE BY MOUTH EVERY DAY BEFORE BREAKFAST      Last office visit with prescribing clinician: 7/11/2023   Last telemedicine visit with prescribing clinician: Visit date not found   Next office visit with prescribing clinician: Visit date not found                         Would you like a call back once the refill request has been completed: [] Yes [] No    If the office needs to give you a call back, can they leave a voicemail: [] Yes [] No    Claribel Tucker MA  10/04/23, 14:25 EDT

## 2023-10-23 RX ORDER — SODIUM PICOSULFATE, MAGNESIUM OXIDE, AND ANHYDROUS CITRIC ACID 12; 3.5; 1 G/175ML; G/175ML; MG/175ML
350 LIQUID ORAL ONCE
Qty: 350 ML | Refills: 0 | Status: SHIPPED | OUTPATIENT
Start: 2023-10-23 | End: 2023-10-23

## 2023-10-30 ENCOUNTER — OUTSIDE FACILITY SERVICE (OUTPATIENT)
Dept: GASTROENTEROLOGY | Facility: CLINIC | Age: 63
End: 2023-10-30
Payer: COMMERCIAL

## 2023-12-04 RX ORDER — TAMSULOSIN HYDROCHLORIDE 0.4 MG/1
1 CAPSULE ORAL DAILY
Qty: 90 CAPSULE | Refills: 0 | Status: SHIPPED | OUTPATIENT
Start: 2023-12-04

## 2024-01-31 DIAGNOSIS — R10.13 DYSPEPSIA: ICD-10-CM

## 2024-02-01 RX ORDER — ESOMEPRAZOLE MAGNESIUM 40 MG/1
CAPSULE, DELAYED RELEASE ORAL
Qty: 90 CAPSULE | Refills: 1 | Status: SHIPPED | OUTPATIENT
Start: 2024-02-01

## 2024-02-29 RX ORDER — TAMSULOSIN HYDROCHLORIDE 0.4 MG/1
1 CAPSULE ORAL DAILY
Qty: 90 CAPSULE | Refills: 0 | Status: SHIPPED | OUTPATIENT
Start: 2024-02-29

## 2024-03-08 RX ORDER — ROSUVASTATIN CALCIUM 20 MG/1
TABLET, COATED ORAL
Qty: 90 TABLET | Refills: 0 | Status: SHIPPED | OUTPATIENT
Start: 2024-03-08

## 2024-05-31 RX ORDER — ROSUVASTATIN CALCIUM 20 MG/1
TABLET, COATED ORAL
Qty: 90 TABLET | Refills: 0 | Status: SHIPPED | OUTPATIENT
Start: 2024-05-31

## 2024-05-31 RX ORDER — TRAZODONE HYDROCHLORIDE 100 MG/1
100 TABLET ORAL NIGHTLY
OUTPATIENT
Start: 2024-05-31

## 2024-05-31 RX ORDER — TAMSULOSIN HYDROCHLORIDE 0.4 MG/1
1 CAPSULE ORAL DAILY
Qty: 90 CAPSULE | Refills: 0 | Status: SHIPPED | OUTPATIENT
Start: 2024-05-31

## 2024-05-31 NOTE — TELEPHONE ENCOUNTER
Rx Refill Note  Requested Prescriptions     Pending Prescriptions Disp Refills    traZODone (DESYREL) 100 MG tablet       Sig: Take 1 tablet by mouth Every Night.      Last office visit with prescribing clinician: 7/11/2023   Last telemedicine visit with prescribing clinician: Visit date not found   Next office visit with prescribing clinician: 6/11/2024                         Would you like a call back once the refill request has been completed: [] Yes [] No    If the office needs to give you a call back, can they leave a voicemail: [] Yes [] No    Claribel Tucker MA  05/31/24, 10:06 EDT

## 2024-06-05 RX ORDER — TRAZODONE HYDROCHLORIDE 100 MG/1
100 TABLET ORAL NIGHTLY
Qty: 90 TABLET | Refills: 0 | Status: SHIPPED | OUTPATIENT
Start: 2024-06-05

## 2024-06-11 ENCOUNTER — OFFICE VISIT (OUTPATIENT)
Dept: FAMILY MEDICINE CLINIC | Facility: CLINIC | Age: 64
End: 2024-06-11
Payer: COMMERCIAL

## 2024-06-11 VITALS
DIASTOLIC BLOOD PRESSURE: 84 MMHG | HEIGHT: 68 IN | OXYGEN SATURATION: 96 % | BODY MASS INDEX: 30.58 KG/M2 | HEART RATE: 70 BPM | WEIGHT: 201.8 LBS | SYSTOLIC BLOOD PRESSURE: 122 MMHG

## 2024-06-11 DIAGNOSIS — G47.9 SLEEPING DIFFICULTY: ICD-10-CM

## 2024-06-11 DIAGNOSIS — R10.13 DYSPEPSIA: ICD-10-CM

## 2024-06-11 DIAGNOSIS — Z00.00 ANNUAL PHYSICAL EXAM: Primary | ICD-10-CM

## 2024-06-11 DIAGNOSIS — Z23 IMMUNIZATION DUE: ICD-10-CM

## 2024-06-11 DIAGNOSIS — R73.03 PREDIABETES: ICD-10-CM

## 2024-06-11 DIAGNOSIS — I10 PRIMARY HYPERTENSION: ICD-10-CM

## 2024-06-11 DIAGNOSIS — Z12.5 SCREENING PSA (PROSTATE SPECIFIC ANTIGEN): ICD-10-CM

## 2024-06-11 DIAGNOSIS — E78.2 MIXED HYPERLIPIDEMIA: ICD-10-CM

## 2024-06-11 DIAGNOSIS — N40.0 BENIGN PROSTATIC HYPERPLASIA WITHOUT LOWER URINARY TRACT SYMPTOMS: ICD-10-CM

## 2024-06-11 DIAGNOSIS — F90.9 ATTENTION DEFICIT HYPERACTIVITY DISORDER (ADHD), UNSPECIFIED ADHD TYPE: ICD-10-CM

## 2024-06-11 PROCEDURE — 99397 PER PM REEVAL EST PAT 65+ YR: CPT | Performed by: STUDENT IN AN ORGANIZED HEALTH CARE EDUCATION/TRAINING PROGRAM

## 2024-06-11 PROCEDURE — 90677 PCV20 VACCINE IM: CPT | Performed by: STUDENT IN AN ORGANIZED HEALTH CARE EDUCATION/TRAINING PROGRAM

## 2024-06-11 PROCEDURE — 90471 IMMUNIZATION ADMIN: CPT | Performed by: STUDENT IN AN ORGANIZED HEALTH CARE EDUCATION/TRAINING PROGRAM

## 2024-06-11 RX ORDER — DEXTROAMPHETAMINE SULFATE, DEXTROAMPHETAMINE SACCHARATE, AMPHETAMINE SULFATE AND AMPHETAMINE ASPARTATE 7.5; 7.5; 7.5; 7.5 MG/1; MG/1; MG/1; MG/1
30 CAPSULE, EXTENDED RELEASE ORAL
COMMUNITY
Start: 2023-12-01

## 2024-06-11 RX ORDER — TRAZODONE HYDROCHLORIDE 100 MG/1
100 TABLET ORAL NIGHTLY
Qty: 90 TABLET | Refills: 3 | Status: SHIPPED | OUTPATIENT
Start: 2024-06-11

## 2024-06-11 RX ORDER — TAMSULOSIN HYDROCHLORIDE 0.4 MG/1
1 CAPSULE ORAL DAILY
Qty: 90 CAPSULE | Refills: 3 | Status: SHIPPED | OUTPATIENT
Start: 2024-06-11

## 2024-06-11 RX ORDER — LISINOPRIL 40 MG/1
40 TABLET ORAL DAILY
Qty: 90 TABLET | Refills: 3 | Status: SHIPPED | OUTPATIENT
Start: 2024-06-11

## 2024-06-11 RX ORDER — ESOMEPRAZOLE MAGNESIUM 40 MG/1
40 CAPSULE, DELAYED RELEASE ORAL
Qty: 90 CAPSULE | Refills: 3 | Status: SHIPPED | OUTPATIENT
Start: 2024-06-11

## 2024-06-11 RX ORDER — ROSUVASTATIN CALCIUM 40 MG/1
40 TABLET, COATED ORAL DAILY
Qty: 90 TABLET | Refills: 3 | Status: SHIPPED | OUTPATIENT
Start: 2024-06-11

## 2024-06-11 NOTE — PROGRESS NOTES
Physical Exam     Patient Name: Fuentes Vanessa  : 1960   MRN: [id number]   Care Team: Patient Care Team:  Clarisse Sanderson DO as PCP - General (Internal Medicine)  Provider, No Known as PCP - Family Medicine    Chief Complaint:    Chief Complaint   Patient presents with    Annual Exam       History of Present Illness: Fuentes Vanessa is a 64 y.o. male with HTN, HLD, ADHD, BPH  who is presents today for annual healthcare maintenance.  He is a .  with children. He is considering retiring within the next 2 years.      Overall feeling well. Compliant with all medications.     He has OA of right base of thumb which is intermittently bothersome. Helps to take NSAID but does not want to be reliant on this. Interested in trying topical voltaren.     PHQ-2 Depression Screening  Little interest or pleasure in doing things? 0-->not at all   Feeling down, depressed, or hopeless? 0-->not at all   PHQ-2 Total Score 0       Health Maintenance   Topic Date Due    ZOSTER VACCINE (1 of 2) Never done    RSV Vaccine - Adults (1 - 1-dose 60+ series) Never done    COVID-19 Vaccine (3 - -24 season) 2023    TDAP/TD VACCINES (2 - Td or Tdap) 2024    LIPID PANEL  2024    BMI FOLLOWUP  2024    INFLUENZA VACCINE  2024    ANNUAL PHYSICAL  2025    COLORECTAL CANCER SCREENING  10/30/2028    HEPATITIS C SCREENING  Completed    Pneumococcal Vaccine 65+  Completed       Subjective      Review of Systems:   Review of Systems - See HPI    Past Medical History:   Past Medical History:   Diagnosis Date    ADHD (attention deficit hyperactivity disorder) 5 years    Allergic 1965    Penicillin, pollen, dust,    Anxiety 2020    Arthritis 2013    Cataract 2018    replaced    GERD (gastroesophageal reflux disease) 2006    Hypertension 2002    Visual impairment 2012    Chronic dry eye       Past Surgical History:   Past Surgical History:   Procedure Laterality Date    JOINT  REPLACEMENT  2013, 2017,2018       Family History:   Family History   Problem Relation Age of Onset    Cancer Father         Lung, Brain    Heart disease Father         Bypass surgery    Stroke Father         Smoker    Hyperlipidemia Father     Stroke Maternal Grandfather         Smoker    COPD Paternal Grandfather         Emphysema, Brake dust, smoking    Diabetes Brother     Stroke Paternal Aunt         Smoker       Social History:   Social History     Socioeconomic History    Marital status:    Tobacco Use    Smoking status: Never    Smokeless tobacco: Never   Vaping Use    Vaping status: Never Used   Substance and Sexual Activity    Alcohol use: Yes     Alcohol/week: 3.0 - 9.0 standard drinks of alcohol     Types: 2 - 6 Cans of beer, 1 - 3 Drinks containing 0.5 oz of alcohol per week     Comment: Socially    Drug use: Never    Sexual activity: Yes     Partners: Female       Tobacco History:   Social History     Tobacco Use   Smoking Status Never   Smokeless Tobacco Never       Medications:     Current Outpatient Medications:     Adderall XR 30 MG 24 hr capsule, Take 1 capsule by mouth, Disp: , Rfl:     esomeprazole (nexIUM) 40 MG capsule, Take 1 capsule by mouth Every Morning Before Breakfast., Disp: 90 capsule, Rfl: 3    guanFACINE HCl ER 2 MG tablet sustained-release 24 hour, Take  by mouth., Disp: , Rfl:     lisinopril (PRINIVIL,ZESTRIL) 40 MG tablet, Take 1 tablet by mouth Daily., Disp: 90 tablet, Rfl: 3    rosuvastatin (CRESTOR) 40 MG tablet, Take 1 tablet by mouth Daily., Disp: 90 tablet, Rfl: 3    tamsulosin (FLOMAX) 0.4 MG capsule 24 hr capsule, Take 1 capsule by mouth Daily., Disp: 90 capsule, Rfl: 3    traZODone (DESYREL) 100 MG tablet, Take 1 tablet by mouth Every Night., Disp: 90 tablet, Rfl: 3    Allergies:   Allergies   Allergen Reactions    Penicillin G Unknown - High Severity       Past Medical History, Social History, Family History and Care Team were all reviewed with patient and updated  "as appropriate.       Objective     Physical Exam:  Vital Signs:   Vitals:    06/11/24 1009   BP: 122/84   Pulse: 70   SpO2: 96%   Weight: 91.5 kg (201 lb 12.8 oz)   Height: 172.7 cm (68\")     Body mass index is 30.68 kg/m².     Physical Exam  Vitals reviewed.   Constitutional:       Appearance: Normal appearance. He is not ill-appearing.   Cardiovascular:      Rate and Rhythm: Normal rate and regular rhythm.      Heart sounds: Normal heart sounds.   Pulmonary:      Effort: Pulmonary effort is normal. No respiratory distress.      Breath sounds: Normal breath sounds. No wheezing.   Skin:     General: Skin is warm and dry.   Neurological:      Mental Status: He is alert.   Psychiatric:         Mood and Affect: Mood normal.         Behavior: Behavior normal.         Judgment: Judgment normal.         Assessment / Plan      Assessment/Plan:   Problems Addressed This Visit  Diagnoses and all orders for this visit:    1. Annual physical exam (Primary)  -     CBC (No Diff); Future  -     Lipid Panel; Future  -     Hemoglobin A1c; Future  -     Comprehensive Metabolic Panel; Future  -     TSH; Future  -     PSA Screen; Future  -     Pneumococcal Conjugate Vaccine 20-Valent (PCV20)    2. Primary hypertension  -     CBC (No Diff); Future  -     Comprehensive Metabolic Panel; Future  -     TSH; Future  -     lisinopril (PRINIVIL,ZESTRIL) 40 MG tablet; Take 1 tablet by mouth Daily.  Dispense: 90 tablet; Refill: 3    3. Prediabetes  -     Hemoglobin A1c; Future    4. Mixed hyperlipidemia  -     Lipid Panel; Future  -     rosuvastatin (CRESTOR) 40 MG tablet; Take 1 tablet by mouth Daily.  Dispense: 90 tablet; Refill: 3    5. Attention deficit hyperactivity disorder (ADHD), unspecified ADHD type    6. Benign prostatic hyperplasia without lower urinary tract symptoms  -     tamsulosin (FLOMAX) 0.4 MG capsule 24 hr capsule; Take 1 capsule by mouth Daily.  Dispense: 90 capsule; Refill: 3    7. Screening PSA (prostate specific " antigen)  -     PSA Screen; Future    8. Sleeping difficulty  -     traZODone (DESYREL) 100 MG tablet; Take 1 tablet by mouth Every Night.  Dispense: 90 tablet; Refill: 3    9. Dyspepsia  -     esomeprazole (nexIUM) 40 MG capsule; Take 1 capsule by mouth Every Morning Before Breakfast.  Dispense: 90 capsule; Refill: 3    10. Immunization due  -     Pneumococcal Conjugate Vaccine 20-Valent (PCV20)        Healthcare Maintenance   Vaccines:  Shingrix recommended at pharmacy  RSV recommended at pharmacy  Covid booster and Tdap offered but politely declined  PCV20 given today    Cancer Screening  PSA today  Colonoscopy 2023 - repeat due in 2028     Other  -PHQ score 0  -Counseled on importance of maintaining healthy diet and routine exercise. Encouraged 150 min exercise per week.  -Tobacco cessation: nonsmoker  -Blood pressure is at goal   -Metabolic screening today    Encouraged routine eye and dental exams.     Following with behavioral health for ADHD and anxiety     Plan of care reviewed with patient at the conclusion of today's visit. Education was provided regarding diagnosis and management.  Patient verbalizes understanding of and agreement with management plan.    Follow Up: annually, sooner prn   No follow-ups on file.        DO RICHARD Chandra PC [address]  [facility]  [address]  [facility] [address]  Fax [phone]  Phone [phone]

## 2024-06-11 NOTE — LETTER
[facility]  Vaccine Consent Form    Patient Name:  Fuentes Vanessa  Patient :  1960     Vaccine(s) Ordered    Pneumococcal Conjugate Vaccine 20-Valent (PCV20)        Screening Checklist  The following questions should be completed prior to vaccination. If you answer “yes” to any question, it does not necessarily mean you should not be vaccinated. It just means we may need to clarify or ask more questions. If a question is unclear, please ask your healthcare provider to explain it.    Yes No   Any fever or moderate to severe illness today (mild illness and/or antibiotic treatment are not contraindications)?     Do you have a history of a serious reaction to any previous vaccinations, such as anaphylaxis, encephalopathy within 7 days, Guillain-[place] syndrome within 6 weeks, seizure?     Have you received any live vaccine(s) (e.g MMR, JARED) or any other vaccines in the last month (to ensure duplicate doses aren't given)?     Do you have an anaphylactic allergy to latex (DTaP, DTaP-IPV, Hep A, Hep B, MenB, RV, Td, Tdap), baker’s yeast (Hep B, HPV), polysorbates (RSV, nirsevimab, PCV 20, Rotavirrus, Tdap, Shingrix), or gelatin (JARED, MMR)?     Do you have an anaphylactic allergy to neomycin (Rabies, JARED, MMR, IPV, Hep A), polymyxin B (IPV), or streptomycin (IPV)?      Any cancer, leukemia, AIDS, or other immune system disorder? (JARED, MMR, RV)     Do you have a parent, brother, or sister with an immune system problem (if immune competence of vaccine recipient clinically verified, can proceed)? (MMR, JARED)     Any recent steroid treatments for >2 weeks, chemotherapy, or radiation treatment? (JARED, MMR)     Have you received antibody-containing blood transfusions or IVIG in the past 11 months (recommended interval is dependent on product)? (MMR, JARED)     Have you taken antiviral drugs (acyclovir, famciclovir, valacyclovir for JARED) in the last 24 or 48 hours, respectively?      Are you pregnant or planning to become  "pregnant within 1 month? (JARED, MMR, HPV, IPV, MenB, Abrexvy; For Hep B- refer to Engerix-B; For RSV - Abrysvo is indicated for 32-36 weeks of pregnancy from September to January)     For infants, have you ever been told your child has had intussusception or a medical emergency involving obstruction of the intestine (Rotavirus)? If not for an infant, can skip this question.         *Ordering Physicians/APC should be consulted if \"yes\" is checked by the patient or guardian above.  I have received, read, and understand the Vaccine Information Statement (VIS) for each vaccine ordered.  I have considered my or my child's health status as well as the health status of my close contacts.  I have taken the opportunity to discuss my vaccine questions with my or my child's health care provider.   I have requested that the ordered vaccine(s) be given to me or my child.  I understand the benefits and risks of the vaccines.  I understand that I should remain in the clinic for 15 minutes after receiving the vaccine(s).  _________________________________________________________  Signature of Patient or Parent/Legal Guardian ____________________  Date     "

## 2024-06-13 ENCOUNTER — LAB (OUTPATIENT)
Dept: LAB | Facility: HOSPITAL | Age: 64
End: 2024-06-13
Payer: COMMERCIAL

## 2024-06-13 DIAGNOSIS — E78.2 MIXED HYPERLIPIDEMIA: ICD-10-CM

## 2024-06-13 DIAGNOSIS — Z00.00 ANNUAL PHYSICAL EXAM: ICD-10-CM

## 2024-06-13 DIAGNOSIS — R73.03 PREDIABETES: ICD-10-CM

## 2024-06-13 DIAGNOSIS — Z12.5 SCREENING PSA (PROSTATE SPECIFIC ANTIGEN): ICD-10-CM

## 2024-06-13 DIAGNOSIS — I10 PRIMARY HYPERTENSION: ICD-10-CM

## 2024-06-13 LAB
ALBUMIN SERPL-MCNC: 4.3 G/DL (ref 3.5–5.2)
ALBUMIN/GLOB SERPL: 1.6 G/DL
ALP SERPL-CCNC: 90 U/L (ref 39–117)
ALT SERPL W P-5'-P-CCNC: 19 U/L (ref 1–41)
ANION GAP SERPL CALCULATED.3IONS-SCNC: 10.7 MMOL/L (ref 5–15)
AST SERPL-CCNC: 20 U/L (ref 1–40)
BILIRUB SERPL-MCNC: 0.4 MG/DL (ref 0–1.2)
BUN SERPL-MCNC: 15 MG/DL (ref 8–23)
BUN/CREAT SERPL: 15.6 (ref 7–25)
CALCIUM SPEC-SCNC: 9.2 MG/DL (ref 8.6–10.5)
CHLORIDE SERPL-SCNC: 107 MMOL/L (ref 98–107)
CHOLEST SERPL-MCNC: 154 MG/DL (ref 0–200)
CO2 SERPL-SCNC: 24.3 MMOL/L (ref 22–29)
CREAT SERPL-MCNC: 0.96 MG/DL (ref 0.76–1.27)
DEPRECATED RDW RBC AUTO: 40.6 FL (ref 37–54)
EGFRCR SERPLBLD CKD-EPI 2021: 87.7 ML/MIN/1.73
ERYTHROCYTE [DISTWIDTH] IN BLOOD BY AUTOMATED COUNT: 12.4 % (ref 12.3–15.4)
GLOBULIN UR ELPH-MCNC: 2.7 GM/DL
GLUCOSE SERPL-MCNC: 93 MG/DL (ref 65–99)
HBA1C MFR BLD: 6 % (ref 4.8–5.6)
HCT VFR BLD AUTO: 44.2 % (ref 37.5–51)
HDLC SERPL-MCNC: 67 MG/DL (ref 40–60)
HGB BLD-MCNC: 14.8 G/DL (ref 13–17.7)
LDLC SERPL CALC-MCNC: 72 MG/DL (ref 0–100)
LDLC/HDLC SERPL: 1.07 {RATIO}
MCH RBC QN AUTO: 30.3 PG (ref 26.6–33)
MCHC RBC AUTO-ENTMCNC: 33.5 G/DL (ref 31.5–35.7)
MCV RBC AUTO: 90.4 FL (ref 79–97)
PLATELET # BLD AUTO: 338 10*3/MM3 (ref 140–450)
PMV BLD AUTO: 9.6 FL (ref 6–12)
POTASSIUM SERPL-SCNC: 4.3 MMOL/L (ref 3.5–5.2)
PROT SERPL-MCNC: 7 G/DL (ref 6–8.5)
PSA SERPL-MCNC: 1.5 NG/ML (ref 0–4)
RBC # BLD AUTO: 4.89 10*6/MM3 (ref 4.14–5.8)
SODIUM SERPL-SCNC: 142 MMOL/L (ref 136–145)
TRIGL SERPL-MCNC: 76 MG/DL (ref 0–150)
TSH SERPL DL<=0.05 MIU/L-ACNC: 1.52 UIU/ML (ref 0.27–4.2)
VLDLC SERPL-MCNC: 15 MG/DL (ref 5–40)
WBC NRBC COR # BLD AUTO: 8.5 10*3/MM3 (ref 3.4–10.8)

## 2024-06-13 PROCEDURE — G0103 PSA SCREENING: HCPCS

## 2024-06-13 PROCEDURE — 80061 LIPID PANEL: CPT

## 2024-06-13 PROCEDURE — 83036 HEMOGLOBIN GLYCOSYLATED A1C: CPT

## 2024-06-13 PROCEDURE — 80050 GENERAL HEALTH PANEL: CPT

## 2024-08-13 RX ORDER — GUANFACINE 2 MG/1
TABLET, EXTENDED RELEASE ORAL
OUTPATIENT
Start: 2024-08-13

## 2024-08-13 NOTE — TELEPHONE ENCOUNTER
Rx Refill Note  Requested Prescriptions     Pending Prescriptions Disp Refills    guanFACINE HCl ER 2 MG tablet sustained-release 24 hour       Sig: Take  by mouth.      Last office visit with prescribing clinician: 6/11/2024   Last telemedicine visit with prescribing clinician: Visit date not found   Next office visit with prescribing clinician: 6/12/2025                         Would you like a call back once the refill request has been completed: [] Yes [] No    If the office needs to give you a call back, can they leave a voicemail: [] Yes [] No    Claribel Tucker MA  08/13/24, 13:21 EDT

## 2024-11-27 ENCOUNTER — OFFICE VISIT (OUTPATIENT)
Dept: FAMILY MEDICINE CLINIC | Facility: CLINIC | Age: 64
End: 2024-11-27
Payer: COMMERCIAL

## 2024-11-27 VITALS
OXYGEN SATURATION: 96 % | HEART RATE: 70 BPM | DIASTOLIC BLOOD PRESSURE: 92 MMHG | SYSTOLIC BLOOD PRESSURE: 124 MMHG | BODY MASS INDEX: 30.52 KG/M2 | WEIGHT: 201.4 LBS | HEIGHT: 68 IN

## 2024-11-27 DIAGNOSIS — R10.31 RIGHT INGUINAL PAIN: ICD-10-CM

## 2024-11-27 DIAGNOSIS — Z23 IMMUNIZATION DUE: ICD-10-CM

## 2024-11-27 DIAGNOSIS — N40.1 BENIGN PROSTATIC HYPERPLASIA WITH LOWER URINARY TRACT SYMPTOMS, SYMPTOM DETAILS UNSPECIFIED: ICD-10-CM

## 2024-11-27 DIAGNOSIS — H81.10 BENIGN PAROXYSMAL POSITIONAL VERTIGO, UNSPECIFIED LATERALITY: Primary | ICD-10-CM

## 2024-11-27 LAB
BILIRUB UR QL STRIP: NEGATIVE
CLARITY UR: CLEAR
COLOR UR: YELLOW
GLUCOSE UR STRIP-MCNC: NEGATIVE MG/DL
HGB UR QL STRIP.AUTO: NEGATIVE
HOLD SPECIMEN: NORMAL
KETONES UR QL STRIP: NEGATIVE
LEUKOCYTE ESTERASE UR QL STRIP.AUTO: NEGATIVE
NITRITE UR QL STRIP: NEGATIVE
PH UR STRIP.AUTO: 6.5 [PH] (ref 5–8)
PROT UR QL STRIP: NEGATIVE
SP GR UR STRIP: 1.01 (ref 1–1.03)
UROBILINOGEN UR QL STRIP: NORMAL

## 2024-11-27 PROCEDURE — 81003 URINALYSIS AUTO W/O SCOPE: CPT | Performed by: STUDENT IN AN ORGANIZED HEALTH CARE EDUCATION/TRAINING PROGRAM

## 2024-11-27 PROCEDURE — 99214 OFFICE O/P EST MOD 30 MIN: CPT | Performed by: STUDENT IN AN ORGANIZED HEALTH CARE EDUCATION/TRAINING PROGRAM

## 2024-11-27 RX ORDER — TAMSULOSIN HYDROCHLORIDE 0.4 MG/1
2 CAPSULE ORAL DAILY
Qty: 180 CAPSULE | Refills: 3 | Status: SHIPPED | OUTPATIENT
Start: 2024-11-27

## 2024-11-27 NOTE — PROGRESS NOTES
Established Office Visit      Patient Name: Fuentes Vanessa  : 1960   MRN: [id number]   Care Team: Patient Care Team:  Clarisse Sanderson DO as PCP - General (Internal Medicine)  Provider, No Known as PCP - Family Medicine    Chief Complaint:    Chief Complaint   Patient presents with    Dizziness       History of Present Illness: Fuentes Vanessa is a 64 y.o. male with HTN, prediabetes, HLD, ADHD, GERD, sleep difficulty who is here today to follow up with concerns as detailed below.    Reports episode of room spinning vertigo which happened about 1 week ago. Prior to this he had URI. Symptoms lasted all day, fluctuating in intensity. Improved with rest and worsened with head movement. Symptoms resolved spontaneously by the next day. He drives 1 hour to work and worries about this happening again while driving. He reports his BP was a little high during the episode but he hadnt taken his medication. Resolved with taking his BP meds. No fever, vision changes, syncope, palpitations, SOA, chest pain. No focal deficits. No symptoms today.    Reports still experiencing some increased urinary frequency, intermittently weak stream, dribbling, urinary hesitancy. No incontinence. No dysuria.  Compliant with tamsulosin.     Has right groin soreness a few times a year which presents as a pulling sensation throughout his groin radiating into his testicle and back. No testicular swelling, redness. No penile discharge. No tenderness. No groin bulge. No identifiable trigger. No symptoms today.      Subjective      Review of Systems:   Review of Systems - See HPI    I have reviewed and the following portions of the patient's history were updated as appropriate: past family history, past medical history, past social history, past surgical history and problem list.    Medications:     Current Outpatient Medications:     Adderall XR 30 MG 24 hr capsule, Take 1 capsule by mouth, Disp: , Rfl:     esomeprazole (nexIUM) 40 MG  "capsule, Take 1 capsule by mouth Every Morning Before Breakfast., Disp: 90 capsule, Rfl: 3    guanFACINE HCl ER 2 MG tablet sustained-release 24 hour, Take  by mouth., Disp: , Rfl:     lisinopril (PRINIVIL,ZESTRIL) 40 MG tablet, Take 1 tablet by mouth Daily., Disp: 90 tablet, Rfl: 3    rosuvastatin (CRESTOR) 40 MG tablet, Take 1 tablet by mouth Daily., Disp: 90 tablet, Rfl: 3    tamsulosin (FLOMAX) 0.4 MG capsule 24 hr capsule, Take 2 capsules by mouth Daily., Disp: 180 capsule, Rfl: 3    traZODone (DESYREL) 100 MG tablet, Take 1 tablet by mouth Every Night., Disp: 90 tablet, Rfl: 3    RSVPreF3 Vac Recomb Adjuvanted (AREXVY) 120 MCG/0.5ML reconstituted suspension injection, Inject 0.5 mL into the appropriate muscle as directed by prescriber 1 (One) Time for 1 dose., Disp: 0.5 mL, Rfl: 0    Allergies:   Allergies   Allergen Reactions    Penicillin G Unknown - High Severity       Objective     Physical Exam:  Vital Signs:   Vitals:    11/27/24 1015   BP: 124/92   Pulse: 70   SpO2: 96%   Weight: 91.4 kg (201 lb 6.4 oz)   Height: 172.7 cm (68\")     Body mass index is 30.62 kg/m².     Physical Exam  Vitals reviewed.   Constitutional:       Appearance: Normal appearance. He is not ill-appearing.   Cardiovascular:      Rate and Rhythm: Normal rate.   Pulmonary:      Effort: Pulmonary effort is normal. No respiratory distress.   Musculoskeletal:      Comments: No inguinal tenderness, mass, or palpable bulge. No testicular swelling or mass. No tenderness.    Skin:     General: Skin is warm and dry.   Neurological:      Mental Status: He is alert.   Psychiatric:         Mood and Affect: Mood normal.         Behavior: Behavior normal.         Judgment: Judgment normal.         Assessment / Plan      Assessment/Plan:   Problems Addressed This Visit  Diagnoses and all orders for this visit:    1. Benign paroxysmal positional vertigo, unspecified laterality (Primary)    2. Benign prostatic hyperplasia with lower urinary tract " symptoms, symptom details unspecified  -     Urinalysis With Culture If Indicated -; Future  -     tamsulosin (FLOMAX) 0.4 MG capsule 24 hr capsule; Take 2 capsules by mouth Daily.  Dispense: 180 capsule; Refill: 3    3. Immunization due  -     RSVPreF3 Vac Recomb Adjuvanted (AREXVY) 120 MCG/0.5ML reconstituted suspension injection; Inject 0.5 mL into the appropriate muscle as directed by prescriber 1 (One) Time for 1 dose.  Dispense: 0.5 mL; Refill: 0    4. Right inguinal pain      Discussed BPPV symptoms are typically benign and self limiting. Suspect this was an isolated event related to preceding URI. Discussed Epley maneuver if he were to experience this again. Also offered prn meclizine but he declined. Will let me know if symptoms recur.    BPH with symptoms - increase tamsulosin to 0.8mg daily. Recommend urology evaluation if symptoms persist. Urine sample today     Inguinal pain - not present today but intermittently flaring twice a year. Recommended he present during flare to assess active symptoms. Offered US to evaluate for inguinal hernia but he politely declined today    Following with behavioral health for ADHD and anxiety     Plan of care reviewed with patient at the conclusion of today's visit. Education was provided regarding diagnosis and management.  Patient verbalizes understanding of and agreement with management plan.    Follow Up:   No follow-ups on file.        DO RICHARD Chandra RD  [facility]  [address]  [facility] [address]  Fax [phone]  Phone [phone]

## 2025-03-15 DIAGNOSIS — N40.1 BENIGN PROSTATIC HYPERPLASIA WITH LOWER URINARY TRACT SYMPTOMS, SYMPTOM DETAILS UNSPECIFIED: ICD-10-CM

## 2025-03-28 DIAGNOSIS — G47.9 SLEEPING DIFFICULTY: ICD-10-CM

## 2025-03-29 RX ORDER — TRAZODONE HYDROCHLORIDE 100 MG/1
100 TABLET ORAL
Qty: 30 TABLET | Refills: 2 | Status: SHIPPED | OUTPATIENT
Start: 2025-03-29

## 2025-03-29 NOTE — TELEPHONE ENCOUNTER
Rx Refill Note  Requested Prescriptions     Pending Prescriptions Disp Refills    traZODone (DESYREL) 100 MG tablet [Pharmacy Med Name: TRAZODONE 100 MG TABLET] 30 tablet 11     Sig: TAKE 1 TABLET BY MOUTH EVERY DAY AT NIGHT      Last office visit with prescribing clinician: 11/27/2024   Last telemedicine visit with prescribing clinician: Visit date not found   Next office visit with prescribing clinician: 6/12/2025                         Would you like a call back once the refill request has been completed: [] Yes [] No    If the office needs to give you a call back, can they leave a voicemail: [] Yes [] No    April ROSALINE Cardenas  03/29/25, 11:43 EDT

## 2025-06-02 ENCOUNTER — OFFICE VISIT (OUTPATIENT)
Dept: FAMILY MEDICINE CLINIC | Facility: CLINIC | Age: 65
End: 2025-06-02
Payer: COMMERCIAL

## 2025-06-02 VITALS
OXYGEN SATURATION: 98 % | SYSTOLIC BLOOD PRESSURE: 158 MMHG | BODY MASS INDEX: 28.4 KG/M2 | WEIGHT: 187.4 LBS | HEART RATE: 85 BPM | DIASTOLIC BLOOD PRESSURE: 92 MMHG | HEIGHT: 68 IN

## 2025-06-02 DIAGNOSIS — M25.511 CHRONIC PAIN OF BOTH SHOULDERS: ICD-10-CM

## 2025-06-02 DIAGNOSIS — G89.29 CHRONIC PAIN OF BOTH SHOULDERS: ICD-10-CM

## 2025-06-02 DIAGNOSIS — M25.512 CHRONIC PAIN OF BOTH SHOULDERS: ICD-10-CM

## 2025-06-02 DIAGNOSIS — I10 PRIMARY HYPERTENSION: Primary | ICD-10-CM

## 2025-06-02 DIAGNOSIS — M19.011 PRIMARY OSTEOARTHRITIS OF BOTH SHOULDERS: ICD-10-CM

## 2025-06-02 DIAGNOSIS — F90.9 ATTENTION DEFICIT HYPERACTIVITY DISORDER (ADHD), UNSPECIFIED ADHD TYPE: ICD-10-CM

## 2025-06-02 DIAGNOSIS — N40.1 BENIGN PROSTATIC HYPERPLASIA WITH LOWER URINARY TRACT SYMPTOMS, SYMPTOM DETAILS UNSPECIFIED: ICD-10-CM

## 2025-06-02 DIAGNOSIS — M19.012 PRIMARY OSTEOARTHRITIS OF BOTH SHOULDERS: ICD-10-CM

## 2025-06-02 DIAGNOSIS — E78.2 MIXED HYPERLIPIDEMIA: ICD-10-CM

## 2025-06-02 DIAGNOSIS — R73.03 PREDIABETES: ICD-10-CM

## 2025-06-02 PROCEDURE — 99214 OFFICE O/P EST MOD 30 MIN: CPT | Performed by: STUDENT IN AN ORGANIZED HEALTH CARE EDUCATION/TRAINING PROGRAM

## 2025-06-02 RX ORDER — TAMSULOSIN HYDROCHLORIDE 0.4 MG/1
2 CAPSULE ORAL DAILY
Qty: 60 CAPSULE | Refills: 11 | OUTPATIENT
Start: 2025-06-02

## 2025-06-02 RX ORDER — TAMSULOSIN HYDROCHLORIDE 0.4 MG/1
2 CAPSULE ORAL DAILY
Qty: 180 CAPSULE | Refills: 3 | Status: SHIPPED | OUTPATIENT
Start: 2025-06-02

## 2025-06-02 RX ORDER — LISDEXAMFETAMINE DIMESYLATE 70 MG/1
70 CAPSULE ORAL EVERY MORNING
COMMUNITY

## 2025-06-02 NOTE — PROGRESS NOTES
Established Office Visit      Patient Name: Fuentes Vanessa  : 1960   MRN: [id number]   Care Team: Patient Care Team:  Clarisse Sanderson DO as PCP - General (Internal Medicine)  Provider, No Known as PCP - Family Medicine    Chief Complaint:    Chief Complaint   Patient presents with    Hypertension    Insomnia       History of Present Illness: Fuentes Vanessa is a 65 y.o. adult with HTN, prediabetes, HLD, ADHD, GERD, sleep difficulty who is here today to follow up with        The patient presents for evaluation of elevated blood pressure, urinary issues, and arthritis.    He has been monitoring his blood pressure at home using an arm cuff, with the highest recorded reading being 158. He reports experiencing transient dizziness upon standing from a head-down position, which resolves within a few seconds. He also notes an increased awareness of his heartbeat, describing it as louder than usual, but does not report any skipped beats or fluttering sensations. He is currently on a daily regimen of lisinopril 40mg for blood pressure management.    He has been prescribed Flomax 0.4 mg, initially taking two pills but has since reduced to one pill following a prescription refill. He reports improved urinary stream with the higher dose but symptoms returned with lower dose. He is uncertain if there was a discrepancy with the pharmacy.    He is also taking Vyvanse 70 mg daily, which he plans to discontinue during the summer. He expresses concern about potential side effects of Vyvanse on his blood pressure and is considering reducing the dosage. He uses Vyvanse to aid focus at work and anticipates that he may not require it post-snf, although he plans to continue working part-time.    He experiences arthritis in his thumbs, which occasionally disrupts his sleep. He prefers not to rely on oral NSAID for pain management and has found some relief with Voltaren gel. He also reports shoulder pain, particularly when  "sleeping on his side, which causes him to frequently change positions during the night. He does not experience much shoulder pain during the day. He has previously undergone physical therapy for his left shoulder and continues to perform light weight exercises with high repetitions at the gym, which he finds beneficial. He notes that his symptoms are exacerbated by sudden weather changes, leading to physical swelling and increased pain upon impact.       Subjective      Review of Systems:   Review of Systems - See HPI    I have reviewed and the following portions of the patient's history were updated as appropriate: past family history, past medical history, past social history, past surgical history and problem list.    Medications:     Current Outpatient Medications:     esomeprazole (nexIUM) 40 MG capsule, Take 1 capsule by mouth Every Morning Before Breakfast., Disp: 90 capsule, Rfl: 3    lisdexamfetamine (VYVANSE) 70 MG capsule, Take 1 capsule by mouth Every Morning, Disp: , Rfl:     lisinopril (PRINIVIL,ZESTRIL) 40 MG tablet, Take 1 tablet by mouth Daily., Disp: 90 tablet, Rfl: 3    rosuvastatin (CRESTOR) 40 MG tablet, Take 1 tablet by mouth Daily., Disp: 90 tablet, Rfl: 3    tamsulosin (FLOMAX) 0.4 MG capsule 24 hr capsule, Take 2 capsules by mouth Daily., Disp: 180 capsule, Rfl: 3    traZODone (DESYREL) 100 MG tablet, TAKE 1 TABLET BY MOUTH EVERY DAY AT NIGHT, Disp: 30 tablet, Rfl: 2    Allergies:   Allergies   Allergen Reactions    Penicillin G Unknown - High Severity       Objective     Physical Exam:  Vital Signs:   Vitals:    06/02/25 1023   BP: 158/92   Pulse: 85   SpO2: 98%   Weight: 85 kg (187 lb 6.4 oz)   Height: 172.7 cm (68\")     Body mass index is 28.49 kg/m².     Physical Exam  Vitals reviewed.   Constitutional:       Appearance: Normal appearance. He is not ill-appearing.   Cardiovascular:      Rate and Rhythm: Normal rate and regular rhythm.      Heart sounds: Normal heart sounds. No murmur " heard.  Pulmonary:      Effort: Pulmonary effort is normal. No respiratory distress.      Breath sounds: Normal breath sounds.   Skin:     General: Skin is warm and dry.   Neurological:      Mental Status: He is alert.   Psychiatric:         Mood and Affect: Mood normal.         Behavior: Behavior normal.         Judgment: Judgment normal.            Cardiovascular: Heart sounds normal, rhythm regular       Assessment / Plan      Assessment/Plan:   Problems Addressed This Visit  Diagnoses and all orders for this visit:    1. Primary hypertension (Primary)    2. Attention deficit hyperactivity disorder (ADHD), unspecified ADHD type    3. Prediabetes    4. Mixed hyperlipidemia    5. Chronic pain of both shoulders  -     Ambulatory Referral to Physical Therapy for Evaluation & Treatment    6. Primary osteoarthritis of both shoulders  -     Ambulatory Referral to Physical Therapy for Evaluation & Treatment    7. Benign prostatic hyperplasia with lower urinary tract symptoms, symptom details unspecified  -     tamsulosin (FLOMAX) 0.4 MG capsule 24 hr capsule; Take 2 capsules by mouth Daily.  Dispense: 180 capsule; Refill: 3           1. HTN, uncontrolled   - Currently on lisinopril, which has effectively managed hypertension for several years until recently.   - Given age and potential cardiac side effects of Vyvanse, discontinuing Vyvanse to assess its impact on blood pressure is recommended. He will cease Vyvanse intake for 1 to 2 weeks and monitor blood pressure.  -Communication of persistent hypertension via MyChart or phone call is advised, at which point another medication can be prescribed. Would consider amlodipine and avoid HCTZ given his LUTS.     2. BPH with LUTS  - Increase Flomax to 0.8mg    3. OA, bilateral thumbs and shoulders  -Thumb OA tolerable with topical voltaren, he is not interested in considering Ortho for injection therapy at this time.  -Referred to PT for bilateral shoulder pain, ok to use  Tylenol or NSAID on prn basis    4. ADHD  -Managed by behavioral health, recommend stopping vyvanse due to uncontrolled HTN as detailed above.              Plan of care reviewed with patient at the conclusion of today's visit. Education was provided regarding diagnosis and management.  Patient verbalizes understanding of and agreement with management plan.    Follow Up: physical in 2 weeks  No follow-ups on file.        DO RICHARD Chandra RD  [facility]  [address]  [facility] [address]  Fax [phone]  Phone [phone]    Patient or patient representative verbalized consent for the use of Ambient Listening during the visit with  Clarisse Sanderson DO for chart documentation. 6/2/2025  11:19 EDT

## 2025-06-16 ENCOUNTER — LAB (OUTPATIENT)
Dept: LAB | Facility: HOSPITAL | Age: 65
End: 2025-06-16
Payer: COMMERCIAL

## 2025-06-16 ENCOUNTER — OFFICE VISIT (OUTPATIENT)
Dept: FAMILY MEDICINE CLINIC | Facility: CLINIC | Age: 65
End: 2025-06-16
Payer: COMMERCIAL

## 2025-06-16 VITALS
HEART RATE: 69 BPM | WEIGHT: 191.4 LBS | HEIGHT: 68 IN | DIASTOLIC BLOOD PRESSURE: 80 MMHG | BODY MASS INDEX: 29.01 KG/M2 | OXYGEN SATURATION: 97 % | SYSTOLIC BLOOD PRESSURE: 118 MMHG

## 2025-06-16 DIAGNOSIS — I10 PRIMARY HYPERTENSION: ICD-10-CM

## 2025-06-16 DIAGNOSIS — Z12.5 ENCOUNTER FOR SCREENING PROSTATE SPECIFIC ANTIGEN (PSA) MEASUREMENT: ICD-10-CM

## 2025-06-16 DIAGNOSIS — Z00.00 ANNUAL PHYSICAL EXAM: Primary | ICD-10-CM

## 2025-06-16 DIAGNOSIS — E78.2 MIXED HYPERLIPIDEMIA: ICD-10-CM

## 2025-06-16 DIAGNOSIS — R73.03 PREDIABETES: ICD-10-CM

## 2025-06-16 DIAGNOSIS — N40.1 BENIGN PROSTATIC HYPERPLASIA WITH LOWER URINARY TRACT SYMPTOMS, SYMPTOM DETAILS UNSPECIFIED: ICD-10-CM

## 2025-06-16 DIAGNOSIS — G47.9 SLEEPING DIFFICULTY: ICD-10-CM

## 2025-06-16 DIAGNOSIS — Z23 IMMUNIZATION DUE: ICD-10-CM

## 2025-06-16 DIAGNOSIS — Z00.00 ANNUAL PHYSICAL EXAM: ICD-10-CM

## 2025-06-16 DIAGNOSIS — F90.9 ATTENTION DEFICIT HYPERACTIVITY DISORDER (ADHD), UNSPECIFIED ADHD TYPE: ICD-10-CM

## 2025-06-16 DIAGNOSIS — R10.13 DYSPEPSIA: ICD-10-CM

## 2025-06-16 LAB
ALBUMIN SERPL-MCNC: 4.1 G/DL (ref 3.5–5.2)
ALBUMIN/GLOB SERPL: 1.5 G/DL
ALP SERPL-CCNC: 101 U/L (ref 39–117)
ALT SERPL W P-5'-P-CCNC: 22 U/L (ref 1–41)
ANION GAP SERPL CALCULATED.3IONS-SCNC: 9.5 MMOL/L (ref 5–15)
AST SERPL-CCNC: 22 U/L (ref 1–40)
BILIRUB SERPL-MCNC: 0.3 MG/DL (ref 0–1.2)
BUN SERPL-MCNC: 18 MG/DL (ref 8–23)
BUN/CREAT SERPL: 19.1 (ref 7–25)
CALCIUM SPEC-SCNC: 9.2 MG/DL (ref 8.6–10.5)
CHLORIDE SERPL-SCNC: 105 MMOL/L (ref 98–107)
CHOLEST SERPL-MCNC: 150 MG/DL (ref 0–200)
CO2 SERPL-SCNC: 23.5 MMOL/L (ref 22–29)
CREAT SERPL-MCNC: 0.94 MG/DL (ref 0.76–1.27)
DEPRECATED RDW RBC AUTO: 44.8 FL (ref 37–54)
EGFRCR SERPLBLD CKD-EPI 2021: 89.4 ML/MIN/1.73
ERYTHROCYTE [DISTWIDTH] IN BLOOD BY AUTOMATED COUNT: 13.2 % (ref 12.3–15.4)
GLOBULIN UR ELPH-MCNC: 2.7 GM/DL
GLUCOSE SERPL-MCNC: 105 MG/DL (ref 65–99)
HBA1C MFR BLD: 5.7 % (ref 4.8–5.6)
HCT VFR BLD AUTO: 43 % (ref 37.5–51)
HDLC SERPL-MCNC: 65 MG/DL (ref 40–60)
HGB BLD-MCNC: 14 G/DL (ref 13–17.7)
LDLC SERPL CALC-MCNC: 69 MG/DL (ref 0–100)
LDLC/HDLC SERPL: 1.05 {RATIO}
MCH RBC QN AUTO: 30.4 PG (ref 26.6–33)
MCHC RBC AUTO-ENTMCNC: 32.6 G/DL (ref 31.5–35.7)
MCV RBC AUTO: 93.5 FL (ref 79–97)
PLATELET # BLD AUTO: 326 10*3/MM3 (ref 140–450)
PMV BLD AUTO: 9.8 FL (ref 6–12)
POTASSIUM SERPL-SCNC: 4.3 MMOL/L (ref 3.5–5.2)
PROT SERPL-MCNC: 6.8 G/DL (ref 6–8.5)
PSA SERPL-MCNC: 1.47 NG/ML (ref 0–4)
RBC # BLD AUTO: 4.6 10*6/MM3 (ref 4.14–5.8)
SODIUM SERPL-SCNC: 138 MMOL/L (ref 136–145)
TRIGL SERPL-MCNC: 84 MG/DL (ref 0–150)
TSH SERPL DL<=0.05 MIU/L-ACNC: 1.36 UIU/ML (ref 0.27–4.2)
VLDLC SERPL-MCNC: 16 MG/DL (ref 5–40)
WBC NRBC COR # BLD AUTO: 6.64 10*3/MM3 (ref 3.4–10.8)

## 2025-06-16 PROCEDURE — 90715 TDAP VACCINE 7 YRS/> IM: CPT | Performed by: STUDENT IN AN ORGANIZED HEALTH CARE EDUCATION/TRAINING PROGRAM

## 2025-06-16 PROCEDURE — G0103 PSA SCREENING: HCPCS

## 2025-06-16 PROCEDURE — 99397 PER PM REEVAL EST PAT 65+ YR: CPT | Performed by: STUDENT IN AN ORGANIZED HEALTH CARE EDUCATION/TRAINING PROGRAM

## 2025-06-16 PROCEDURE — 83036 HEMOGLOBIN GLYCOSYLATED A1C: CPT

## 2025-06-16 PROCEDURE — 80050 GENERAL HEALTH PANEL: CPT

## 2025-06-16 PROCEDURE — 90471 IMMUNIZATION ADMIN: CPT | Performed by: STUDENT IN AN ORGANIZED HEALTH CARE EDUCATION/TRAINING PROGRAM

## 2025-06-16 PROCEDURE — 80061 LIPID PANEL: CPT

## 2025-06-16 RX ORDER — TRAZODONE HYDROCHLORIDE 100 MG/1
100 TABLET ORAL
Qty: 30 TABLET | Refills: 5 | Status: SHIPPED | OUTPATIENT
Start: 2025-06-16

## 2025-06-16 RX ORDER — ESOMEPRAZOLE MAGNESIUM 40 MG/1
40 CAPSULE, DELAYED RELEASE ORAL
Qty: 90 CAPSULE | Refills: 3 | Status: SHIPPED | OUTPATIENT
Start: 2025-06-16

## 2025-06-16 RX ORDER — LISINOPRIL 40 MG/1
40 TABLET ORAL DAILY
Qty: 90 TABLET | Refills: 3 | Status: SHIPPED | OUTPATIENT
Start: 2025-06-16

## 2025-06-16 RX ORDER — ROSUVASTATIN CALCIUM 40 MG/1
40 TABLET, COATED ORAL DAILY
Qty: 90 TABLET | Refills: 3 | Status: SHIPPED | OUTPATIENT
Start: 2025-06-16

## 2025-06-16 NOTE — LETTER
[facility]  Vaccine Consent Form    Patient Name:  Fuentes Vanessa  Patient :  1960     Vaccine(s) Ordered    Tdap Vaccine => 6yo IM (BOOSTRIX/ADACEL)        Screening Checklist  The following questions should be completed prior to vaccination. If you answer “yes” to any question, it does not necessarily mean you should not be vaccinated. It just means we may need to clarify or ask more questions. If a question is unclear, please ask your healthcare provider to explain it.    Yes No   Any fever or moderate to severe illness today (mild illness and/or antibiotic treatment are not contraindications)?     Do you have a history of a serious reaction to any previous vaccinations, such as anaphylaxis, encephalopathy within 7 days, Guillain-[place]lo[place] syndrome within 6 weeks, seizure?     Have you received any live vaccine(s) (e.g MMR, JARED) or any other vaccines in the last month (to ensure duplicate doses aren't given)?     Do you have an anaphylactic allergy to latex (DTaP, DTaP-IPV, Hep A, Hep B, MenB, RV, Td, Tdap), baker’s yeast (Hep B, HPV), polysorbates (RSV, nirsevimab, PCV 20 and 21, Rotavirrus, Tdap, Shingrix), or gelatin (JARED, MMR)?     Do you have an anaphylactic allergy to neomycin (Rabies, JARED, MMR, IPV, Hep A), polymyxin B (IPV), or streptomycin (IPV)?      Any cancer, leukemia, AIDS, or other immune system disorder? (JARED, MMR, RV)     Do you have a parent, brother, or sister with an immune system problem (if immune competence of vaccine recipient clinically verified, can proceed)? (MMR, JARED)     Any recent steroid treatments for >2 weeks, chemotherapy, or radiation treatment? (JARED, MMR)     Have you received antibody-containing blood transfusions or IVIG in the past 11 months (recommended interval is dependent on product)? (MMR, JARED)     Have you taken antiviral drugs (acyclovir, famciclovir, valacyclovir for JARED) in the last 24 or 48 hours, respectively?      Are you pregnant or planning to become  "pregnant within 1 month? (JARED, MMR, HPV, IPV, MenB, Abrexvy; For Hep B- refer to Engerix-B; For RSV - Abrysvo is indicated for 32-36 weeks of pregnancy from September to January)     For infants, have you ever been told your child has had intussusception or a medical emergency involving obstruction of the intestine (Rotavirus)? If not for an infant, can skip this question.         *Ordering Physicians/APC should be consulted if \"yes\" is checked by the patient or guardian above.  I have received, read, and understand the Vaccine Information Statement (VIS) for each vaccine ordered.  I have considered my or my child's health status as well as the health status of my close contacts.  I have taken the opportunity to discuss my vaccine questions with my or my child's health care provider.   I have requested that the ordered vaccine(s) be given to me or my child.  I understand the benefits and risks of the vaccines.  I understand that I should remain in the clinic for 15 minutes after receiving the vaccine(s).  _________________________________________________________  Signature of Patient or Parent/Legal Guardian ____________________  Date     "

## 2025-07-09 ENCOUNTER — PATIENT MESSAGE (OUTPATIENT)
Dept: FAMILY MEDICINE CLINIC | Facility: CLINIC | Age: 65
End: 2025-07-09
Payer: COMMERCIAL

## 2025-07-09 DIAGNOSIS — I10 PRIMARY HYPERTENSION: Primary | ICD-10-CM

## 2025-07-10 RX ORDER — HYDROCHLOROTHIAZIDE 12.5 MG/1
12.5 TABLET ORAL DAILY
Qty: 30 TABLET | Refills: 2 | Status: SHIPPED | OUTPATIENT
Start: 2025-07-10